# Patient Record
Sex: MALE | Race: WHITE | NOT HISPANIC OR LATINO | Employment: UNEMPLOYED | URBAN - METROPOLITAN AREA
[De-identification: names, ages, dates, MRNs, and addresses within clinical notes are randomized per-mention and may not be internally consistent; named-entity substitution may affect disease eponyms.]

---

## 2024-02-26 ENCOUNTER — LAB (OUTPATIENT)
Dept: LAB | Facility: LAB | Age: 24
End: 2024-02-26
Payer: COMMERCIAL

## 2024-02-26 ENCOUNTER — OFFICE VISIT (OUTPATIENT)
Dept: PRIMARY CARE | Facility: CLINIC | Age: 24
End: 2024-02-26
Payer: COMMERCIAL

## 2024-02-26 VITALS
HEIGHT: 74 IN | TEMPERATURE: 97.5 F | OXYGEN SATURATION: 97 % | SYSTOLIC BLOOD PRESSURE: 120 MMHG | BODY MASS INDEX: 24.1 KG/M2 | DIASTOLIC BLOOD PRESSURE: 81 MMHG | WEIGHT: 187.8 LBS | HEART RATE: 91 BPM

## 2024-02-26 DIAGNOSIS — Z79.899 MEDICATION MANAGEMENT: ICD-10-CM

## 2024-02-26 DIAGNOSIS — F98.8 ATTENTION DEFICIT DISORDER (ADD) WITHOUT HYPERACTIVITY: ICD-10-CM

## 2024-02-26 DIAGNOSIS — Z00.00 WELL ADULT EXAM: ICD-10-CM

## 2024-02-26 DIAGNOSIS — Z00.00 WELL ADULT EXAM: Primary | ICD-10-CM

## 2024-02-26 LAB
25(OH)D3 SERPL-MCNC: 24 NG/ML (ref 30–100)
ALBUMIN SERPL BCP-MCNC: 4.9 G/DL (ref 3.4–5)
ALP SERPL-CCNC: 53 U/L (ref 33–120)
ALT SERPL W P-5'-P-CCNC: 28 U/L (ref 10–52)
AMPHETAMINES UR QL SCN: NORMAL
ANION GAP SERPL CALC-SCNC: 11 MMOL/L (ref 10–20)
AST SERPL W P-5'-P-CCNC: 18 U/L (ref 9–39)
BARBITURATES UR QL SCN: NORMAL
BASOPHILS # BLD AUTO: 0.08 X10*3/UL (ref 0–0.1)
BASOPHILS NFR BLD AUTO: 1.1 %
BENZODIAZ UR QL SCN: NORMAL
BILIRUB SERPL-MCNC: 0.4 MG/DL (ref 0–1.2)
BUN SERPL-MCNC: 8 MG/DL (ref 6–23)
BZE UR QL SCN: NORMAL
CALCIUM SERPL-MCNC: 9.9 MG/DL (ref 8.6–10.3)
CANNABINOIDS UR QL SCN: NORMAL
CHLORIDE SERPL-SCNC: 103 MMOL/L (ref 98–107)
CHOLEST SERPL-MCNC: 143 MG/DL (ref 0–199)
CHOLESTEROL/HDL RATIO: 3.1
CO2 SERPL-SCNC: 31 MMOL/L (ref 21–32)
CREAT SERPL-MCNC: 1.05 MG/DL (ref 0.5–1.3)
EGFRCR SERPLBLD CKD-EPI 2021: >90 ML/MIN/1.73M*2
EOSINOPHIL # BLD AUTO: 0.13 X10*3/UL (ref 0–0.7)
EOSINOPHIL NFR BLD AUTO: 1.8 %
ERYTHROCYTE [DISTWIDTH] IN BLOOD BY AUTOMATED COUNT: 12.3 % (ref 11.5–14.5)
FENTANYL+NORFENTANYL UR QL SCN: NORMAL
GLUCOSE SERPL-MCNC: 89 MG/DL (ref 74–99)
HCT VFR BLD AUTO: 48 % (ref 41–52)
HDLC SERPL-MCNC: 46.5 MG/DL
HGB BLD-MCNC: 16.2 G/DL (ref 13.5–17.5)
IMM GRANULOCYTES # BLD AUTO: 0.03 X10*3/UL (ref 0–0.7)
IMM GRANULOCYTES NFR BLD AUTO: 0.4 % (ref 0–0.9)
LDLC SERPL CALC-MCNC: 82 MG/DL
LYMPHOCYTES # BLD AUTO: 2.31 X10*3/UL (ref 1.2–4.8)
LYMPHOCYTES NFR BLD AUTO: 32.8 %
MCH RBC QN AUTO: 28.7 PG (ref 26–34)
MCHC RBC AUTO-ENTMCNC: 33.8 G/DL (ref 32–36)
MCV RBC AUTO: 85 FL (ref 80–100)
MONOCYTES # BLD AUTO: 0.42 X10*3/UL (ref 0.1–1)
MONOCYTES NFR BLD AUTO: 6 %
NEUTROPHILS # BLD AUTO: 4.07 X10*3/UL (ref 1.2–7.7)
NEUTROPHILS NFR BLD AUTO: 57.9 %
NON HDL CHOLESTEROL: 97 MG/DL (ref 0–149)
NRBC BLD-RTO: 0 /100 WBCS (ref 0–0)
OPIATES UR QL SCN: NORMAL
OXYCODONE+OXYMORPHONE UR QL SCN: NORMAL
PCP UR QL SCN: NORMAL
PLATELET # BLD AUTO: 263 X10*3/UL (ref 150–450)
POTASSIUM SERPL-SCNC: 4.2 MMOL/L (ref 3.5–5.3)
PROT SERPL-MCNC: 7.4 G/DL (ref 6.4–8.2)
RBC # BLD AUTO: 5.64 X10*6/UL (ref 4.5–5.9)
SODIUM SERPL-SCNC: 141 MMOL/L (ref 136–145)
TRIGL SERPL-MCNC: 71 MG/DL (ref 0–149)
TSH SERPL-ACNC: 1.54 MIU/L (ref 0.44–3.98)
VIT B12 SERPL-MCNC: 590 PG/ML (ref 211–911)
VLDL: 14 MG/DL (ref 0–40)
WBC # BLD AUTO: 7 X10*3/UL (ref 4.4–11.3)

## 2024-02-26 PROCEDURE — 82306 VITAMIN D 25 HYDROXY: CPT

## 2024-02-26 PROCEDURE — 1036F TOBACCO NON-USER: CPT | Performed by: INTERNAL MEDICINE

## 2024-02-26 PROCEDURE — 84443 ASSAY THYROID STIM HORMONE: CPT

## 2024-02-26 PROCEDURE — 80061 LIPID PANEL: CPT

## 2024-02-26 PROCEDURE — 82607 VITAMIN B-12: CPT

## 2024-02-26 PROCEDURE — 80053 COMPREHEN METABOLIC PANEL: CPT

## 2024-02-26 PROCEDURE — 36415 COLL VENOUS BLD VENIPUNCTURE: CPT

## 2024-02-26 PROCEDURE — 93000 ELECTROCARDIOGRAM COMPLETE: CPT | Performed by: INTERNAL MEDICINE

## 2024-02-26 PROCEDURE — 85025 COMPLETE CBC W/AUTO DIFF WBC: CPT

## 2024-02-26 PROCEDURE — 99395 PREV VISIT EST AGE 18-39: CPT | Performed by: INTERNAL MEDICINE

## 2024-02-26 PROCEDURE — 80307 DRUG TEST PRSMV CHEM ANLYZR: CPT

## 2024-02-26 PROCEDURE — 99214 OFFICE O/P EST MOD 30 MIN: CPT | Performed by: INTERNAL MEDICINE

## 2024-02-26 RX ORDER — LISDEXAMFETAMINE DIMESYLATE 30 MG/1
30 CAPSULE ORAL EVERY MORNING
Qty: 30 CAPSULE | Refills: 0 | Status: SHIPPED | OUTPATIENT
Start: 2024-04-26 | End: 2024-03-20

## 2024-02-26 RX ORDER — LISDEXAMFETAMINE DIMESYLATE 30 MG/1
30 CAPSULE ORAL EVERY MORNING
Qty: 30 CAPSULE | Refills: 0 | Status: SHIPPED | OUTPATIENT
Start: 2024-02-26 | End: 2024-03-20

## 2024-02-26 RX ORDER — LISDEXAMFETAMINE DIMESYLATE 30 MG/1
30 CAPSULE ORAL EVERY MORNING
Qty: 30 CAPSULE | Refills: 0 | Status: SHIPPED | OUTPATIENT
Start: 2024-03-27 | End: 2024-03-20

## 2024-02-26 ASSESSMENT — PATIENT HEALTH QUESTIONNAIRE - PHQ9
1. LITTLE INTEREST OR PLEASURE IN DOING THINGS: SEVERAL DAYS
2. FEELING DOWN, DEPRESSED OR HOPELESS: NOT AT ALL
10. IF YOU CHECKED OFF ANY PROBLEMS, HOW DIFFICULT HAVE THESE PROBLEMS MADE IT FOR YOU TO DO YOUR WORK, TAKE CARE OF THINGS AT HOME, OR GET ALONG WITH OTHER PEOPLE: SOMEWHAT DIFFICULT
SUM OF ALL RESPONSES TO PHQ9 QUESTIONS 1 AND 2: 1

## 2024-02-26 NOTE — PROGRESS NOTES
"Subjective       Current Issues:  Current concerns include going back to the gym  .fidgeting  Cannot concentrate   Caffeine a lot  3 cups per day  Not tired  Always had trouble concentrating  Door dash  Sleep: all night  No bowel or bladder issues  No cp or sob or depression  Trouble getting tasks done  Review of Nutrition:  Current diet: good   Exercise discussed    Gen:  no fever  HEENT:  no trouble swallowing  CV:  no dyspnea, cyanosis  Lungs:  no shortness of breath  GI:  no constipation, no blood in stool  Vascular:  no edema  Neuro:   no weakness  Skin:  no rash  MS:no joint swelling  Gu:  no urinary complaints  All other systems have been reviewed and are negative for complaint      Screening Questions:  Objective   /81   Pulse 91   Temp 36.4 °C (97.5 °F) (Temporal)   Ht 1.88 m (6' 2\")   Wt 85.2 kg (187 lb 12.8 oz)   SpO2 97%   BMI 24.11 kg/m²       General:   alert and oriented, in no acute distress   Gait:   normal   Skin:   normal   Oral cavity:   lips, mucosa, and tongue normal; teeth and gums normal   Eyes:   sclerae white, pupils equal and reactive   Ears:   normal bilaterally Tms grey   Neck:   no adenopathy and thyroid not enlarged, symmetric, no tenderness/mass/nodules   Lungs:  clear to auscultation bilaterally   Heart:   regular rate and rhythm, S1, S2 normal, no murmur, click, rub or gallop   Abdomen:  soft, non-tender; bowel sounds normal; no masses, no organomegaly   : Ne        Extremities:  extremities normal, warm and well-perfused; no cyanosis, clubbing, or edema,   Neuro:  normal without focal findings and muscle tone and strength normal and symmetric          Az was seen today for primary care physical.  Diagnoses and all orders for this visit:  Well adult exam (Primary)  -     Comprehensive Metabolic Panel; Future  -     TSH with reflex to Free T4 if abnormal; Future  -     Vitamin D 25-Hydroxy,Total (for eval of Vitamin D levels); Future  -     Vitamin B12; Future  -   "   Lipid Panel; Future  -     CBC and Auto Differential; Future  -     ECG 12 Lead  Attention deficit disorder (ADD) without hyperactivity  -     Comprehensive Metabolic Panel; Future  -     TSH with reflex to Free T4 if abnormal; Future  -     Vitamin D 25-Hydroxy,Total (for eval of Vitamin D levels); Future  -     Vitamin B12; Future  -     Lipid Panel; Future  -     CBC and Auto Differential; Future  -     ECG 12 Lead  -     lisdexamfetamine (Vyvanse) 30 mg capsule; Take 1 capsule (30 mg) by mouth once daily in the morning.  -     lisdexamfetamine (Vyvanse) 30 mg capsule; Take 1 capsule (30 mg) by mouth once daily in the morning. Do not start before March 27, 2024.  -     lisdexamfetamine (Vyvanse) 30 mg capsule; Take 1 capsule (30 mg) by mouth once daily in the morning. Do not start before April 26, 2024.  Medication management  -     Comprehensive Metabolic Panel; Future  -     TSH with reflex to Free T4 if abnormal; Future  -     Vitamin D 25-Hydroxy,Total (for eval of Vitamin D levels); Future  -     Vitamin B12; Future  -     Lipid Panel; Future  -     CBC and Auto Differential; Future  -     ECG 12 Lead  -     Drug Screen, Urine With Reflex to Confirmation; Future  -     Drug Screen, Urine With Reflex to Confirmation       Previous labs reviewed.     Follow up in one month or prn   The risks and benefits and side effects of medication were reviewed with the patient.   Subjective   Patient ID: Az Atkins is a 23 y.o. male who presents for primary care physical (Wants to discuss adhd/Has been drinking a lot of coffee 3 a day ).    HPI      Gen:  no fever  HEENT:  no trouble swallowing  CV:  no dyspnea, cyanosis  Lungs:  no shortness of breath  GI:  no constipation, no blood in stool  Vascular:  no edema  Neuro:   no weakness  Skin:  no rash  MS:no joint swelling  Gu:  no urinary complaints  All other systems have been reviewed and are negative for complaint      /81   Pulse 91   Temp 36.4 °C  "(97.5 °F) (Temporal)   Ht 1.88 m (6' 2\")   Wt 85.2 kg (187 lb 12.8 oz)   SpO2 97%   BMI 24.11 kg/m²   Objective   Gen:  alert, nad  Eyes: per sclera white   Neck: supple no tm, no lad  Lungs:  CTAB  Heart:  RRR no murmur  Abd:  soft, NT, ND  Neuro: nonfocal          Assessment/Plan    Problem List Items Addressed This Visit    None  Visit Diagnoses       Well adult exam    -  Primary    Relevant Orders    Comprehensive Metabolic Panel (Completed)    TSH with reflex to Free T4 if abnormal (Completed)    Vitamin D 25-Hydroxy,Total (for eval of Vitamin D levels) (Completed)    Vitamin B12 (Completed)    Lipid Panel (Completed)    CBC and Auto Differential (Completed)    ECG 12 Lead (Completed)    Attention deficit disorder (ADD) without hyperactivity        Relevant Medications    lisdexamfetamine (Vyvanse) 30 mg capsule    lisdexamfetamine (Vyvanse) 30 mg capsule (Start on 3/27/2024)    lisdexamfetamine (Vyvanse) 30 mg capsule (Start on 4/26/2024)    Other Relevant Orders    Comprehensive Metabolic Panel (Completed)    TSH with reflex to Free T4 if abnormal (Completed)    Vitamin D 25-Hydroxy,Total (for eval of Vitamin D levels) (Completed)    Vitamin B12 (Completed)    Lipid Panel (Completed)    CBC and Auto Differential (Completed)    ECG 12 Lead (Completed)    Medication management        Relevant Orders    Comprehensive Metabolic Panel (Completed)    TSH with reflex to Free T4 if abnormal (Completed)    Vitamin D 25-Hydroxy,Total (for eval of Vitamin D levels) (Completed)    Vitamin B12 (Completed)    Lipid Panel (Completed)    CBC and Auto Differential (Completed)    ECG 12 Lead (Completed)    Drug Screen, Urine With Reflex to Confirmation (Completed)            Previous labs reviewed.    The risks and benefits and side effects of medication were reviewed with the patient.     OARRS:  Ashley De Dios MD on 2/26/2024  3:54 PM  I have personally reviewed the OARRS report for Az Atkins. I have " considered the risks of abuse, dependence, addiction and diversion    Is the patient prescribed a combination of a benzodiazepine and opioid?  No    Last Urine Drug Screen / ordered today: Yes  Recent Results (from the past 8760 hour(s))   Drug Screen, Urine With Reflex to Confirmation    Collection Time: 02/26/24  4:45 PM   Result Value Ref Range    Amphetamine Screen, Urine Presumptive Negative Presumptive Negative    Barbiturate Screen, Urine Presumptive Negative Presumptive Negative    Benzodiazepines Screen, Urine Presumptive Negative Presumptive Negative    Cannabinoid Screen, Urine Presumptive Negative Presumptive Negative    Cocaine Metabolite Screen, Urine Presumptive Negative Presumptive Negative    Fentanyl Screen, Urine Presumptive Negative Presumptive Negative    Opiate Screen, Urine Presumptive Negative Presumptive Negative    Oxycodone Screen, Urine Presumptive Negative Presumptive Negative    PCP Screen, Urine Presumptive Negative Presumptive Negative     Results are as expected.     Controlled Substance Agreement:  Date of the Last Agreement: 2/26/24    Reviewed Controlled Substance Agreement including but not limited to the benefits, risks, and alternatives to treatment with a Controlled Substance medication(s).    Stimulants:   What is the patient's goal of therapy? Concentration, focus   Is this being achieved with current treatment? New start

## 2024-02-27 DIAGNOSIS — E55.9 VITAMIN D DEFICIENCY: ICD-10-CM

## 2024-02-27 PROBLEM — N50.3 CYST OF EPIDIDYMIS: Status: ACTIVE | Noted: 2024-02-27

## 2024-02-27 PROBLEM — K21.9 GERD (GASTROESOPHAGEAL REFLUX DISEASE): Status: ACTIVE | Noted: 2024-02-27

## 2024-02-27 PROBLEM — F32.A DEPRESSED: Status: ACTIVE | Noted: 2024-02-27

## 2024-02-27 PROBLEM — Z86.19 HISTORY OF INFECTIOUS DISEASE: Status: ACTIVE | Noted: 2024-02-27

## 2024-02-27 PROBLEM — J35.1 ENLARGED TONSILS: Status: ACTIVE | Noted: 2019-03-21

## 2024-02-27 PROBLEM — R06.02 SHORTNESS OF BREATH: Status: ACTIVE | Noted: 2024-02-27

## 2024-02-27 PROBLEM — R10.9 ABDOMINAL DISCOMFORT: Status: ACTIVE | Noted: 2024-02-27

## 2024-02-27 PROBLEM — F41.9 ANXIETY: Status: ACTIVE | Noted: 2024-02-27

## 2024-02-27 PROBLEM — R53.83 FATIGUE: Status: ACTIVE | Noted: 2019-03-21

## 2024-02-27 PROBLEM — R00.2 PALPITATIONS: Status: ACTIVE | Noted: 2019-03-21

## 2024-02-27 PROBLEM — R20.2 TINGLING: Status: ACTIVE | Noted: 2024-02-27

## 2024-02-27 PROBLEM — N50.9 DISORDER OF MALE GENITAL ORGANS: Status: ACTIVE | Noted: 2024-02-27

## 2024-02-27 PROBLEM — R20.2 TINGLING OF SKIN: Status: ACTIVE | Noted: 2024-02-27

## 2024-02-27 PROBLEM — R14.0 BLOATING SYMPTOM: Status: ACTIVE | Noted: 2024-02-27

## 2024-02-27 PROBLEM — I86.1 LEFT VARICOCELE: Status: ACTIVE | Noted: 2024-02-27

## 2024-02-27 PROBLEM — R79.89 ELEVATED LFTS: Status: ACTIVE | Noted: 2024-02-27

## 2024-02-27 RX ORDER — ERGOCALCIFEROL 1.25 MG/1
50000 CAPSULE ORAL
Qty: 6 CAPSULE | Refills: 0 | Status: SHIPPED | OUTPATIENT
Start: 2024-02-27 | End: 2024-04-09

## 2024-03-18 ENCOUNTER — HOSPITAL ENCOUNTER (EMERGENCY)
Facility: HOSPITAL | Age: 24
Discharge: HOME | End: 2024-03-18
Payer: COMMERCIAL

## 2024-03-18 ENCOUNTER — TELEPHONE (OUTPATIENT)
Dept: PRIMARY CARE | Facility: CLINIC | Age: 24
End: 2024-03-18

## 2024-03-18 ENCOUNTER — APPOINTMENT (OUTPATIENT)
Dept: CARDIOLOGY | Facility: HOSPITAL | Age: 24
End: 2024-03-18
Payer: COMMERCIAL

## 2024-03-18 ENCOUNTER — APPOINTMENT (OUTPATIENT)
Dept: RADIOLOGY | Facility: HOSPITAL | Age: 24
End: 2024-03-18
Payer: COMMERCIAL

## 2024-03-18 VITALS
HEIGHT: 74 IN | WEIGHT: 180 LBS | SYSTOLIC BLOOD PRESSURE: 121 MMHG | OXYGEN SATURATION: 100 % | RESPIRATION RATE: 17 BRPM | DIASTOLIC BLOOD PRESSURE: 82 MMHG | BODY MASS INDEX: 23.1 KG/M2 | TEMPERATURE: 98.1 F | HEART RATE: 89 BPM

## 2024-03-18 DIAGNOSIS — K29.70 GASTRITIS WITHOUT BLEEDING, UNSPECIFIED CHRONICITY, UNSPECIFIED GASTRITIS TYPE: ICD-10-CM

## 2024-03-18 DIAGNOSIS — R06.02 SHORTNESS OF BREATH: Primary | ICD-10-CM

## 2024-03-18 LAB
ALBUMIN SERPL BCP-MCNC: 5 G/DL (ref 3.4–5)
ALP SERPL-CCNC: 49 U/L (ref 33–120)
ALT SERPL W P-5'-P-CCNC: 23 U/L (ref 10–52)
ANION GAP SERPL CALC-SCNC: 12 MMOL/L (ref 10–20)
AST SERPL W P-5'-P-CCNC: 19 U/L (ref 9–39)
BASOPHILS # BLD AUTO: 0.08 X10*3/UL (ref 0–0.1)
BASOPHILS NFR BLD AUTO: 1.1 %
BILIRUB SERPL-MCNC: 0.8 MG/DL (ref 0–1.2)
BNP SERPL-MCNC: 13 PG/ML (ref 0–99)
BUN SERPL-MCNC: 9 MG/DL (ref 6–23)
CALCIUM SERPL-MCNC: 9.8 MG/DL (ref 8.6–10.3)
CARDIAC TROPONIN I PNL SERPL HS: 3 NG/L (ref 0–20)
CARDIAC TROPONIN I PNL SERPL HS: <3 NG/L (ref 0–20)
CHLORIDE SERPL-SCNC: 102 MMOL/L (ref 98–107)
CO2 SERPL-SCNC: 29 MMOL/L (ref 21–32)
CREAT SERPL-MCNC: 1.09 MG/DL (ref 0.5–1.3)
D DIMER PPP FEU-MCNC: <215 NG/ML FEU
EGFRCR SERPLBLD CKD-EPI 2021: >90 ML/MIN/1.73M*2
EOSINOPHIL # BLD AUTO: 0.22 X10*3/UL (ref 0–0.7)
EOSINOPHIL NFR BLD AUTO: 3 %
ERYTHROCYTE [DISTWIDTH] IN BLOOD BY AUTOMATED COUNT: 12.2 % (ref 11.5–14.5)
FLUAV RNA RESP QL NAA+PROBE: NOT DETECTED
FLUBV RNA RESP QL NAA+PROBE: NOT DETECTED
GLUCOSE SERPL-MCNC: 82 MG/DL (ref 74–99)
HCT VFR BLD AUTO: 47.4 % (ref 41–52)
HGB BLD-MCNC: 16.5 G/DL (ref 13.5–17.5)
IMM GRANULOCYTES # BLD AUTO: 0.02 X10*3/UL (ref 0–0.7)
IMM GRANULOCYTES NFR BLD AUTO: 0.3 % (ref 0–0.9)
LACTATE SERPL-SCNC: 1 MMOL/L (ref 0.4–2)
LYMPHOCYTES # BLD AUTO: 3.07 X10*3/UL (ref 1.2–4.8)
LYMPHOCYTES NFR BLD AUTO: 41.7 %
MAGNESIUM SERPL-MCNC: 2.21 MG/DL (ref 1.6–2.4)
MCH RBC QN AUTO: 29.2 PG (ref 26–34)
MCHC RBC AUTO-ENTMCNC: 34.8 G/DL (ref 32–36)
MCV RBC AUTO: 84 FL (ref 80–100)
MONOCYTES # BLD AUTO: 0.5 X10*3/UL (ref 0.1–1)
MONOCYTES NFR BLD AUTO: 6.8 %
NEUTROPHILS # BLD AUTO: 3.48 X10*3/UL (ref 1.2–7.7)
NEUTROPHILS NFR BLD AUTO: 47.1 %
NRBC BLD-RTO: 0 /100 WBCS (ref 0–0)
PLATELET # BLD AUTO: 231 X10*3/UL (ref 150–450)
POTASSIUM SERPL-SCNC: 3.6 MMOL/L (ref 3.5–5.3)
PROT SERPL-MCNC: 7.7 G/DL (ref 6.4–8.2)
RBC # BLD AUTO: 5.65 X10*6/UL (ref 4.5–5.9)
RSV RNA RESP QL NAA+PROBE: NOT DETECTED
SARS-COV-2 RNA RESP QL NAA+PROBE: NOT DETECTED
SODIUM SERPL-SCNC: 139 MMOL/L (ref 136–145)
WBC # BLD AUTO: 7.4 X10*3/UL (ref 4.4–11.3)

## 2024-03-18 PROCEDURE — 87637 SARSCOV2&INF A&B&RSV AMP PRB: CPT | Performed by: PHYSICIAN ASSISTANT

## 2024-03-18 PROCEDURE — 83880 ASSAY OF NATRIURETIC PEPTIDE: CPT | Performed by: PHYSICIAN ASSISTANT

## 2024-03-18 PROCEDURE — 36415 COLL VENOUS BLD VENIPUNCTURE: CPT | Performed by: PHYSICIAN ASSISTANT

## 2024-03-18 PROCEDURE — 99283 EMERGENCY DEPT VISIT LOW MDM: CPT | Mod: 25

## 2024-03-18 PROCEDURE — 71045 X-RAY EXAM CHEST 1 VIEW: CPT

## 2024-03-18 PROCEDURE — 84484 ASSAY OF TROPONIN QUANT: CPT | Performed by: PHYSICIAN ASSISTANT

## 2024-03-18 PROCEDURE — 93005 ELECTROCARDIOGRAM TRACING: CPT

## 2024-03-18 PROCEDURE — 85025 COMPLETE CBC W/AUTO DIFF WBC: CPT | Performed by: PHYSICIAN ASSISTANT

## 2024-03-18 PROCEDURE — 85379 FIBRIN DEGRADATION QUANT: CPT | Performed by: PHYSICIAN ASSISTANT

## 2024-03-18 PROCEDURE — 83605 ASSAY OF LACTIC ACID: CPT | Performed by: PHYSICIAN ASSISTANT

## 2024-03-18 PROCEDURE — 80053 COMPREHEN METABOLIC PANEL: CPT | Performed by: PHYSICIAN ASSISTANT

## 2024-03-18 PROCEDURE — 71045 X-RAY EXAM CHEST 1 VIEW: CPT | Performed by: STUDENT IN AN ORGANIZED HEALTH CARE EDUCATION/TRAINING PROGRAM

## 2024-03-18 PROCEDURE — 83735 ASSAY OF MAGNESIUM: CPT | Performed by: PHYSICIAN ASSISTANT

## 2024-03-18 RX ORDER — SUCRALFATE 1 G/10ML
1 SUSPENSION ORAL 4 TIMES DAILY
Qty: 1200 ML | Refills: 0 | Status: SHIPPED | OUTPATIENT
Start: 2024-03-18 | End: 2024-03-20 | Stop reason: WASHOUT

## 2024-03-18 ASSESSMENT — COLUMBIA-SUICIDE SEVERITY RATING SCALE - C-SSRS
6. HAVE YOU EVER DONE ANYTHING, STARTED TO DO ANYTHING, OR PREPARED TO DO ANYTHING TO END YOUR LIFE?: NO
2. HAVE YOU ACTUALLY HAD ANY THOUGHTS OF KILLING YOURSELF?: NO
1. IN THE PAST MONTH, HAVE YOU WISHED YOU WERE DEAD OR WISHED YOU COULD GO TO SLEEP AND NOT WAKE UP?: NO

## 2024-03-18 ASSESSMENT — LIFESTYLE VARIABLES
EVER HAD A DRINK FIRST THING IN THE MORNING TO STEADY YOUR NERVES TO GET RID OF A HANGOVER: NO
HAVE YOU EVER FELT YOU SHOULD CUT DOWN ON YOUR DRINKING: NO
EVER FELT BAD OR GUILTY ABOUT YOUR DRINKING: NO
HAVE PEOPLE ANNOYED YOU BY CRITICIZING YOUR DRINKING: NO

## 2024-03-18 ASSESSMENT — PAIN - FUNCTIONAL ASSESSMENT: PAIN_FUNCTIONAL_ASSESSMENT: 0-10

## 2024-03-18 ASSESSMENT — PAIN DESCRIPTION - LOCATION: LOCATION: CHEST

## 2024-03-18 ASSESSMENT — PAIN DESCRIPTION - PAIN TYPE: TYPE: ACUTE PAIN

## 2024-03-18 ASSESSMENT — PAIN SCALES - GENERAL: PAINLEVEL_OUTOF10: 5 - MODERATE PAIN

## 2024-03-18 NOTE — ED PROVIDER NOTES
HPI   Chief Complaint   Patient presents with    Shortness of Breath     Pt presents to the ED with SOB that has gotten worse since Tuesday. Feels like its hard to inhale. Making it difficult for him to sleep.        A 23-year-old male patient comes in the emergency department today with complaints of left-sided chest pain, shortness of breath.  States started couple days ago.  Describes it as a achiness or pressure.  Rates it a 5 out of 10 on the pain scale.  Patient states he feels short of breath extremities deep breath and causes him discomfort.  Denies any recent travel, recent surgeries.  Denies any fevers or chills.  States he does have a cough.  Otherwise no other complaints at present time.  This purpose comes in the emergency department today further evaluation.  States he is under a lot of stress not sure that stress related wants to make sure there is nothing critical serious going on.                          [place] Coma Scale Score: 15                     Patient History   Past Medical History:   Diagnosis Date    Personal history of other infectious and parasitic diseases     History of rotavirus infection    Personal history of other specified conditions 11/09/2022    History of shortness of breath     Past Surgical History:   Procedure Laterality Date    CIRCUMCISION, PRIMARY  11/01/2017    Elective Circumcision     Family History   Problem Relation Name Age of Onset    Breast cancer Mother  45    No Known Problems Father      No Known Problems Brother       Social History     Tobacco Use    Smoking status: Never    Smokeless tobacco: Never   Substance Use Topics    Alcohol use: Yes     Comment: occ weekends    Drug use: Never       Physical Exam   ED Triage Vitals [03/18/24 0914]   Temperature Heart Rate Respirations BP   36.7 °C (98.1 °F) 89 17 121/82      Pulse Ox Temp Source Heart Rate Source Patient Position   100 % Temporal Monitor Sitting      BP Location FiO2 (%)     Right arm --        Physical Exam  Constitutional:       General: He is in acute distress (Mild distress).      Appearance: Normal appearance. He is not ill-appearing or diaphoretic.   HENT:      Head: Normocephalic and atraumatic.      Nose: Nose normal.   Eyes:      Extraocular Movements: Extraocular movements intact.      Conjunctiva/sclera: Conjunctivae normal.      Pupils: Pupils are equal, round, and reactive to light.   Cardiovascular:      Rate and Rhythm: Normal rate and regular rhythm.   Pulmonary:      Effort: Pulmonary effort is normal. No respiratory distress.      Breath sounds: Normal breath sounds. No stridor. No wheezing.   Chest:      Chest wall: No tenderness or crepitus.   Musculoskeletal:         General: Normal range of motion.      Cervical back: Normal range of motion.   Skin:     General: Skin is warm and dry.   Neurological:      General: No focal deficit present.      Mental Status: He is alert and oriented to person, place, and time. Mental status is at baseline.   Psychiatric:         Mood and Affect: Mood normal.         ED Course & MDM   Diagnoses as of 03/18/24 1134   Shortness of breath   Gastritis without bleeding, unspecified chronicity, unspecified gastritis type       Medical Decision Making  A 23-year-old male patient comes in the emergency department today with complaints of left-sided chest pain, shortness of breath.  States started couple days ago.  Describes it as a achiness or pressure.  Rates it a 5 out of 10 on the pain scale.  Patient states he feels short of breath extremities deep breath and causes him discomfort.  Denies any recent travel, recent surgeries.  Denies any fevers or chills.  States he does have a cough.  Otherwise no other complaints at present time.  This purpose comes in the emergency department today further evaluation.  States he is under a lot of stress not sure that stress related wants to make sure there is nothing critical serious going on.    EKG, chest x-ray,  laboratory studies ordered to rule out ACS, arrhythmias, electrolyte abnormalities, COVID-19, influenza, RSV, pulmonary emboli.,  Pneumonia, pulmonary congestion.      Patient has negative delta troponins negative for influenza COVID-19 RSV.  Negative BNP.  Negative CMP negative magnesium negative lactate negative D-dimer negative CBC.  Chest x-ray is negative as well.  Will discharge patient home with follow-up with his PCP.  Patient states his stomach is also little uncomfortable.  Will send patient home on p.o. Carafate.  Patient agrees with this plan expressed full verbal understanding.  I did offer patient a work note patient declined.    Historian is the patient    Diagnosis: Shortness of breath      Labs Reviewed   COMPREHENSIVE METABOLIC PANEL - Normal       Result Value    Glucose 82      Sodium 139      Potassium 3.6      Chloride 102      Bicarbonate 29      Anion Gap 12      Urea Nitrogen 9      Creatinine 1.09      eGFR >90      Calcium 9.8      Albumin 5.0      Alkaline Phosphatase 49      Total Protein 7.7      AST 19      Bilirubin, Total 0.8      ALT 23     MAGNESIUM - Normal    Magnesium 2.21     LACTATE - Normal    Lactate 1.0      Narrative:     Venipuncture immediately after or during the administration of Metamizole may lead to falsely low results. Testing should be performed immediately  prior to Metamizole dosing.   B-TYPE NATRIURETIC PEPTIDE - Normal    BNP 13      Narrative:        <100 pg/mL - Heart failure unlikely  100-299 pg/mL - Intermediate probability of acute heart                  failure exacerbation. Correlate with clinical                  context and patient history.    >=300 pg/mL - Heart Failure likely. Correlate with clinical                  context and patient history.    BNP testing is performed using different testing methodology at [facility] than at other [facility]. Direct result comparisons should only be made within the same method.      D-DIMER,  VTE EXCLUSION - Normal    D-Dimer, Quantitative VTE Exclusion <215      Narrative:     The VTE Exclusion D-Dimer assay is reported in ng/mL Fibrinogen Equivalent Units (FEU).    Per 's instructions for use, a value of less than 500 ng/mL (FEU) may help to exclude DVT or PE in outpatients when the assay is used with a clinical pretest probability assessment.([facility] must utilize and document eCalc 'Wells Score Deep Vein Thrombosis Risk' for DVT exclusion only. Emergency Department should utilize  Guidelines for Emergency Department Use of the VTE Exclusion D-Dimer and Clinical Pretest probability assessment model for DVT or PE exclusion.)   SARS-COV-2 AND INFLUENZA A/B PCR - Normal    Flu A Result Not Detected      Flu B Result Not Detected      Coronavirus 2019, PCR Not Detected      Narrative:     This assay has received FDA Emergency Use Authorization (EUA) and  is only authorized for the duration of time that circumstances exist to justify the authorization of the emergency use of in vitro diagnostic tests for the detection of SARS-CoV-2 virus and/or diagnosis of COVID-19 infection under section 564(b)(1) of the Act, 21 U.S.C. 360bbb-3(b)(1). Testing for SARS-CoV-2 is only recommended for patients who meet current clinical and/or epidemiological criteria as defined by federal, state, or local public health directives. This assay is an in vitro diagnostic nucleic acid amplification test for the qualitative detection of SARS-CoV-2, Influenza A, and Influenza B from nasopharyngeal specimens and has been validated for use at [facility]. Negative results do not preclude COVID-19 infections or Influenza A/B infections, and should not be used as the sole basis for diagnosis, treatment, or other management decisions. If Influenza A/B and RSV PCR results are negative, testing for Parainfluenza virus, Adenovirus and Metapneumovirus is routinely performed for [facility] [place] and  intensive care inpatients, and is available on other patients by placing an add-on request.    RSV PCR - Normal    RSV PCR Not Detected      Narrative:     This assay is an FDA-cleared, in vitro diagnostic nucleic acid amplification test for the detection of RSV from nasopharyngeal specimens, and has been validated for use at [facility]. Negative results do not preclude RSV infections, and should not be used as the sole basis for diagnosis, treatment, or other management decisions. If Influenza A/B and RSV PCR results are negative, testing for Parainfluenza virus, Adenovirus and Metapneumovirus is routinely performed for pediatric oncology and intensive care inpatients at [facility], and is available on other patients by placing an add-on request.       SERIAL TROPONIN-INITIAL - Normal    Troponin I, High Sensitivity 3      Narrative:     Less than 99th percentile of normal range cutoff-  Female and children under 18 years old <14 ng/L; Male <21 ng/L: Negative  Repeat testing should be performed if clinically indicated.     Female and children under 18 years old 14-50 ng/L; Male 21-50 ng/L:  Consistent with possible cardiac damage and possible increased clinical   risk. Serial measurements may help to assess extent of myocardial damage.     >50 ng/L: Consistent with cardiac damage, increased clinical risk and  myocardial infarction. Serial measurements may help assess extent of   myocardial damage.      NOTE: Children less than 1 year old may have higher baseline troponin   levels and results should be interpreted in conjunction with the overall   clinical context.     NOTE: Troponin I testing is performed using a different   testing methodology at [facility] than at [facility]. Direct result comparisons should only   be made within the same method.   SERIAL TROPONIN, 1 HOUR - Normal    Troponin I, High Sensitivity <3      Narrative:     Less than 99th percentile of  normal range cutoff-  Female and children under 18 years old <14 ng/L; Male <21 ng/L: Negative  Repeat testing should be performed if clinically indicated.     Female and children under 18 years old 14-50 ng/L; Male 21-50 ng/L:  Consistent with possible cardiac damage and possible increased clinical   risk. Serial measurements may help to assess extent of myocardial damage.     >50 ng/L: Consistent with cardiac damage, increased clinical risk and  myocardial infarction. Serial measurements may help assess extent of   myocardial damage.      NOTE: Children less than 1 year old may have higher baseline troponin   levels and results should be interpreted in conjunction with the overall   clinical context.     NOTE: Troponin I testing is performed using a different   testing methodology at [facility] than at other   [facility]. Direct result comparisons should only   be made within the same method.   TROPONIN SERIES- (INITIAL, 1 HR)    Narrative:     The following orders were created for panel order Troponin I Series, High Sensitivity (0, 1 HR).  Procedure                               Abnormality         Status                     ---------                               -----------         ------                     Troponin I, High Sensiti...[[id number]]  Normal              Final result               Troponin, High Sensitivi...[[id number]]  Normal              Final result                 Please view results for these tests on the individual orders.   CBC WITH AUTO DIFFERENTIAL    WBC 7.4      nRBC 0.0      RBC 5.65      Hemoglobin 16.5      Hematocrit 47.4      MCV 84      MCH 29.2      MCHC 34.8      RDW 12.2      Platelets 231      Neutrophils % 47.1      Immature Granulocytes %, Automated 0.3      Lymphocytes % 41.7      Monocytes % 6.8      Eosinophils % 3.0      Basophils % 1.1      Neutrophils Absolute 3.48      Immature Granulocytes Absolute, Automated 0.02      Lymphocytes Absolute 3.07       Monocytes Absolute 0.50      Eosinophils Absolute 0.22      Basophils Absolute 0.08          XR chest 1 view   Final Result   No acute cardiopulmonary process.        MACRO   None        Signed by: Jenn Noland 3/18/2024 9:51 AM   Dictation workstation:   [id number]            Procedure  ECG 12 lead    Performed by: Pj Coello PA-C  Authorized by: Pj Coello PA-C    Interpretation:     Details:  My EKG interpretation  Rate:     ECG rate:  65    ECG rate assessment: normal    Rhythm:     Rhythm: sinus rhythm    ST segments:     ST segments:  Normal  T waves:     T waves: normal         Pj Coello PA-C  03/18/24 1134

## 2024-03-18 NOTE — TELEPHONE ENCOUNTER
Pt called in and left VM stated that Father called in and spoke with someone earlier regarding Pt having SOB. Pt went into ED to be checked out. Xray , EKG and blood work all came back normal and he was cleared to go home. Pt needs scheduled this week for a follow up appointment with MATIAS or NP . Pt stated that while in ED they prescribed him medication for his stomach but Rx is very expensive and would like to discuss Rx options, lung check, general medical questions.     Clerical please reach out to Pt an help schedule this week per MATIAS. - ED F/U

## 2024-03-19 ENCOUNTER — OFFICE VISIT (OUTPATIENT)
Dept: PRIMARY CARE | Facility: CLINIC | Age: 24
End: 2024-03-19
Payer: COMMERCIAL

## 2024-03-19 ENCOUNTER — HOSPITAL ENCOUNTER (OUTPATIENT)
Dept: RADIOLOGY | Facility: CLINIC | Age: 24
Discharge: HOME | End: 2024-03-19
Payer: COMMERCIAL

## 2024-03-19 VITALS
OXYGEN SATURATION: 98 % | SYSTOLIC BLOOD PRESSURE: 113 MMHG | HEIGHT: 74 IN | HEART RATE: 81 BPM | WEIGHT: 188.6 LBS | DIASTOLIC BLOOD PRESSURE: 65 MMHG | TEMPERATURE: 97 F | BODY MASS INDEX: 24.2 KG/M2

## 2024-03-19 DIAGNOSIS — H61.21 EXCESSIVE CERUMEN IN RIGHT EAR CANAL: ICD-10-CM

## 2024-03-19 DIAGNOSIS — R10.9 ABDOMINAL PAIN, UNSPECIFIED ABDOMINAL LOCATION: ICD-10-CM

## 2024-03-19 DIAGNOSIS — F98.8 ATTENTION DEFICIT DISORDER (ADD) WITHOUT HYPERACTIVITY: Primary | ICD-10-CM

## 2024-03-19 DIAGNOSIS — K59.00 CONSTIPATION, UNSPECIFIED CONSTIPATION TYPE: ICD-10-CM

## 2024-03-19 DIAGNOSIS — R42 VERTIGO: ICD-10-CM

## 2024-03-19 LAB
ATRIAL RATE: 65 BPM
P AXIS: 46 DEGREES
P OFFSET: 194 MS
P ONSET: 142 MS
PR INTERVAL: 152 MS
Q ONSET: 218 MS
QRS COUNT: 10 BEATS
QRS DURATION: 98 MS
QT INTERVAL: 382 MS
QTC CALCULATION(BAZETT): 397 MS
QTC FREDERICIA: 392 MS
R AXIS: 86 DEGREES
T AXIS: 61 DEGREES
T OFFSET: 409 MS
VENTRICULAR RATE: 65 BPM

## 2024-03-19 PROCEDURE — 74018 RADEX ABDOMEN 1 VIEW: CPT | Performed by: RADIOLOGY

## 2024-03-19 PROCEDURE — 1036F TOBACCO NON-USER: CPT | Performed by: INTERNAL MEDICINE

## 2024-03-19 PROCEDURE — 69209 REMOVE IMPACTED EAR WAX UNI: CPT | Performed by: INTERNAL MEDICINE

## 2024-03-19 PROCEDURE — 74018 RADEX ABDOMEN 1 VIEW: CPT

## 2024-03-19 PROCEDURE — 99214 OFFICE O/P EST MOD 30 MIN: CPT | Performed by: INTERNAL MEDICINE

## 2024-03-19 RX ORDER — DEXTROAMPHETAMINE SACCHARATE, AMPHETAMINE ASPARTATE MONOHYDRATE, DEXTROAMPHETAMINE SULFATE AND AMPHETAMINE SULFATE 2.5; 2.5; 2.5; 2.5 MG/1; MG/1; MG/1; MG/1
10 CAPSULE, EXTENDED RELEASE ORAL EVERY MORNING
Qty: 30 CAPSULE | Refills: 0 | Status: SHIPPED | OUTPATIENT
Start: 2024-03-19 | End: 2024-04-18 | Stop reason: WASHOUT

## 2024-03-19 RX ORDER — POLYETHYLENE GLYCOL 3350 17 G/17G
17 POWDER, FOR SOLUTION ORAL DAILY
Qty: 30 PACKET | Refills: 1 | Status: SHIPPED | OUTPATIENT
Start: 2024-03-19 | End: 2024-07-17

## 2024-03-19 RX ORDER — DEXTROAMPHETAMINE SACCHARATE, AMPHETAMINE ASPARTATE MONOHYDRATE, DEXTROAMPHETAMINE SULFATE AND AMPHETAMINE SULFATE 2.5; 2.5; 2.5; 2.5 MG/1; MG/1; MG/1; MG/1
10 CAPSULE, EXTENDED RELEASE ORAL EVERY MORNING
Qty: 30 CAPSULE | Refills: 0 | Status: SHIPPED | OUTPATIENT
Start: 2024-05-18 | End: 2024-04-18 | Stop reason: WASHOUT

## 2024-03-19 RX ORDER — ASCORBIC ACID 125 MG
TABLET,CHEWABLE ORAL
COMMUNITY

## 2024-03-19 RX ORDER — DEXTROAMPHETAMINE SACCHARATE, AMPHETAMINE ASPARTATE MONOHYDRATE, DEXTROAMPHETAMINE SULFATE AND AMPHETAMINE SULFATE 2.5; 2.5; 2.5; 2.5 MG/1; MG/1; MG/1; MG/1
10 CAPSULE, EXTENDED RELEASE ORAL EVERY MORNING
Qty: 30 CAPSULE | Refills: 0 | Status: SHIPPED | OUTPATIENT
Start: 2024-04-18 | End: 2024-04-18 | Stop reason: WASHOUT

## 2024-03-19 ASSESSMENT — PATIENT HEALTH QUESTIONNAIRE - PHQ9
1. LITTLE INTEREST OR PLEASURE IN DOING THINGS: NOT AT ALL
SUM OF ALL RESPONSES TO PHQ9 QUESTIONS 1 AND 2: 0
2. FEELING DOWN, DEPRESSED OR HOPELESS: NOT AT ALL

## 2024-03-19 NOTE — PROGRESS NOTES
"Subjective   Patient ID: Az Atkins is a 23 y.o. male who presents for Follow-up (Er follow up and medical questions./Sob - went ed /EKG, xray and blood work clear/Constipation - stomach issues /Breathing better/Possible anxiety ).  HPI  Had some constipation  Laxative   Was using a few per day  Mom helped  Bm daily now still feels like he   Does not  evacuate enough  Vertigo on Monday Tues breathing issues progressively worse  1 hour of sleep   Went to er  EKG,cxr ok   ? Anxiety  Slept well last night  Vertigo better     Vyvanse cost prohibitive   Review of Systems  Gen:  no fever  HEENT:  no trouble swallowing  CV:  no dyspnea, cyanosis  Lungs:  no shortness of breath  GI:    no blood in stool  Vascular:  no edema  Neuro:   no weakness  Skin:  no rash  MS:no joint swelling  Gu:  no urinary complaints  All other systems have been reviewed and are negative for complaint    /65   Pulse 81   Temp 36.1 °C (97 °F) (Temporal)   Ht 1.88 m (6' 2\")   Wt 85.5 kg (188 lb 9.6 oz)   SpO2 98%   BMI 24.21 kg/m²   Objective   Physical Exam  Lab Results   Component Value Date    WBC 7.4 03/18/2024    HGB 16.5 03/18/2024    HCT 47.4 03/18/2024    MCV 84 03/18/2024     03/18/2024     Lab Results   Component Value Date    GLUCOSE 82 03/18/2024    CALCIUM 9.8 03/18/2024     03/18/2024    K 3.6 03/18/2024    CO2 29 03/18/2024     03/18/2024    BUN 9 03/18/2024    CREATININE 1.09 03/18/2024     Social History     Socioeconomic History    Marital status: Single     Spouse name: None    Number of children: None    Years of education: None    Highest education level: None   Occupational History    None   Tobacco Use    Smoking status: Never    Smokeless tobacco: Never   Substance and Sexual Activity    Alcohol use: Yes     Comment: occ weekends    Drug use: Never    Sexual activity: None   Other Topics Concern    None   Social History Narrative    None     Social Determinants of Health     Financial " Resource Strain: Not on file   Food Insecurity: Not on file   Transportation Needs: Not on file   Physical Activity: Not on file   Stress: Not on file   Social Connections: Not on file   Intimate Partner Violence: Not on file   Housing Stability: Not on file     Family History   Problem Relation Name Age of Onset    Breast cancer Mother  45    No Known Problems Father      No Known Problems Brother         General:  Alert and in  NAD  Heent:  tms nl, throat clear.   Prior to flush r cerumen imp   Lungs, CTAB  Skin:  no suspicious lesions,  warm and dry  Head :  Normocephalic  Neck/thyroid:  neck supple, full rom, no cervical lymphadenopathy  no thyromegaly  Heart:  RRR  no murmurs  Abdomen:  Normal , bs present, soft, nontender, not distended, no masses palpated  Extremities:  No clubbing, cyanosis, or edema  Neurologic:  Nonfocal  Psych: alert, normal mood   Az was seen today for follow-up.  Diagnoses and all orders for this visit:  Attention deficit disorder (ADD) without hyperactivity (Primary)  -     amphetamine-dextroamphetamine XR (Adderall XR) 10 mg 24 hr capsule; Take 1 capsule (10 mg) by mouth once daily in the morning. Do not crush or chew.  -     amphetamine-dextroamphetamine XR (Adderall XR) 10 mg 24 hr capsule; Take 1 capsule (10 mg) by mouth once daily in the morning. Do not crush or chew. Do not start before April 18, 2024.  -     amphetamine-dextroamphetamine XR (Adderall XR) 10 mg 24 hr capsule; Take 1 capsule (10 mg) by mouth once daily in the morning. Do not crush or chew. Do not start before May 18, 2024.  Abdominal pain, unspecified abdominal location  -     XR abdomen 1 view; Future  Excessive cerumen in right ear canal  -     Ear cerumen removal; Future  Vertigo  Constipation, unspecified constipation type  -     polyethylene glycol (Glycolax, Miralax) 17 gram packet; Take 17 g by mouth once daily. Mix 1 cap (17g) into 8 ounces of fluid.  Other orders  -     Ear Cerumen Removal    Follow  up in one month or prn

## 2024-03-19 NOTE — PROGRESS NOTES
Controlled Substance Visit:  I have personally reviewed the OARRS report and have considered the risks of abuse, dependence, addiction and diversion and I believe that it is clinically appropriate for the patient to be prescribed this medication.    Is the patient prescribed a combination of a benzodiazepine and opioid?  No    Last Urine Drug Screen / ordered today: No  Recent Results (from the past 8760 hour(s))   Drug Screen, Urine With Reflex to Confirmation    Collection Time: 24  4:45 PM   Result Value Ref Range    Amphetamine Screen, Urine Presumptive Negative Presumptive Negative    Barbiturate Screen, Urine Presumptive Negative Presumptive Negative    Benzodiazepines Screen, Urine Presumptive Negative Presumptive Negative    Cannabinoid Screen, Urine Presumptive Negative Presumptive Negative    Cocaine Metabolite Screen, Urine Presumptive Negative Presumptive Negative    Fentanyl Screen, Urine Presumptive Negative Presumptive Negative    Opiate Screen, Urine Presumptive Negative Presumptive Negative    Oxycodone Screen, Urine Presumptive Negative Presumptive Negative    PCP Screen, Urine Presumptive Negative Presumptive Negative     Results are as expected.     Controlled Substance Agreement:  Date of the Last Agreement: 2024  I have reviewed each line item on the Controlled Substance Agreement including, but not limited to, the benefits, risks, and alternatives to treatment with a Controlled Substance medication(s). The patient has verbalized understanding and signed the agreement.    Stimulants:   What is the patient's goal of therapy? add  Is this being achieved with current treatment? New start     Activities of Daily Living:   Is your overall impression that this patient is benefiting (symptom reduction outweighs side effects) from stimulant therapy? New start     1. Physical Functionin. Family Relationship:   3. Social Relationship:   4. Mood:   5. Sleep Patterns:   6. Overall  Function:       Gen: Alert, NAD  HEENT:  PERRL  conjunctiva and sclera normal in appearance; Neck supple  Respiratory:  Lungs CTAB  Cardiovascular:  Heart RRR. No M/R/G  Abdomen: soft, nontender, nondistended   Neuro:  Gross motor and sensory intact  Skin:  No suspicious lesions present   Mood: normal        Agree

## 2024-03-19 NOTE — PROGRESS NOTES
Ear Cerumen Removal    Date/Time: 3/19/2024 11:47 AM    Performed by: Marely Colon MA  Authorized by: Ashley De Dios MD    Consent:     Consent obtained:  Verbal    Consent given by:  Patient  Universal protocol:     Patient identity confirmed:  Verbally with patient  Procedure details:     Location:  R ear    Procedure type: irrigation      Procedure outcomes: cerumen removed    Post-procedure details:     Procedure completion:  Tolerated well, no immediate complications

## 2024-04-17 PROBLEM — F90.0 ATTENTION DEFICIT HYPERACTIVITY DISORDER (ADHD), PREDOMINANTLY INATTENTIVE TYPE: Status: ACTIVE | Noted: 2024-04-17

## 2024-04-18 ENCOUNTER — OFFICE VISIT (OUTPATIENT)
Dept: PRIMARY CARE | Facility: CLINIC | Age: 24
End: 2024-04-18
Payer: COMMERCIAL

## 2024-04-18 VITALS
DIASTOLIC BLOOD PRESSURE: 73 MMHG | OXYGEN SATURATION: 97 % | BODY MASS INDEX: 24.6 KG/M2 | WEIGHT: 191.6 LBS | TEMPERATURE: 97.2 F | HEART RATE: 87 BPM | SYSTOLIC BLOOD PRESSURE: 123 MMHG

## 2024-04-18 DIAGNOSIS — Z79.899 MEDICATION MANAGEMENT: ICD-10-CM

## 2024-04-18 DIAGNOSIS — F90.0 ATTENTION DEFICIT HYPERACTIVITY DISORDER (ADHD), PREDOMINANTLY INATTENTIVE TYPE: Primary | ICD-10-CM

## 2024-04-18 PROBLEM — J35.1 ENLARGED TONSILS: Status: RESOLVED | Noted: 2019-03-21 | Resolved: 2024-04-18

## 2024-04-18 PROCEDURE — 1036F TOBACCO NON-USER: CPT | Performed by: NURSE PRACTITIONER

## 2024-04-18 PROCEDURE — 99214 OFFICE O/P EST MOD 30 MIN: CPT | Performed by: NURSE PRACTITIONER

## 2024-04-18 RX ORDER — DEXTROAMPHETAMINE SACCHARATE, AMPHETAMINE ASPARTATE MONOHYDRATE, DEXTROAMPHETAMINE SULFATE AND AMPHETAMINE SULFATE 2.5; 2.5; 2.5; 2.5 MG/1; MG/1; MG/1; MG/1
10 CAPSULE, EXTENDED RELEASE ORAL EVERY MORNING
Qty: 30 CAPSULE | Refills: 0 | Status: SHIPPED | OUTPATIENT
Start: 2024-04-18 | End: 2024-05-20 | Stop reason: SDUPTHER

## 2024-04-18 ASSESSMENT — PATIENT HEALTH QUESTIONNAIRE - PHQ9
1. LITTLE INTEREST OR PLEASURE IN DOING THINGS: NOT AT ALL
2. FEELING DOWN, DEPRESSED OR HOPELESS: NOT AT ALL
SUM OF ALL RESPONSES TO PHQ9 QUESTIONS 1 AND 2: 0

## 2024-04-18 NOTE — PROGRESS NOTES
"Problem List Items Addressed This Visit       Attention deficit hyperactivity disorder (ADHD), predominantly inattentive type - Primary    Overview     3/19/24: adderall XR 10 mg initiated  4/18/24: tolerating well and effective. Ok to titrate. 90 day fu, prn          Relevant Medications    amphetamine-dextroamphetamine XR (Adderall XR) 10 mg 24 hr capsule    Medication management    Overview     CSA, UDS, OARRS, CSV [facility] compliant                Subjective   Patient ID: Az Atkins is a 23 y.o. male who presents for Follow-up (Med follow up/No current problems right now ).  HPI  ADD  Organization  Emotionally shut down  Forgetful  Small tasked seemed huge  He struggled in school  Works at [employer]     MATIAS started adderall XR 10 mg 3/19/24  Was having some bad dreams  - resolved  Rising earlier  Able to maintain focus  Feels less overwhelmed    Review of Systems   All other systems reviewed and are negative.      BP Readings from Last 3 Encounters:   04/18/24 123/73   03/19/24 113/65   03/18/24 121/82      Wt Readings from Last 3 Encounters:   04/18/24 86.9 kg (191 lb 9.6 oz)   03/19/24 85.5 kg (188 lb 9.6 oz)   03/18/24 81.6 kg (180 lb)      BMI:   Estimated body mass index is 24.6 kg/m² as calculated from the following:    Height as of 3/19/24: 1.88 m (6' 2\").    Weight as of this encounter: 86.9 kg (191 lb 9.6 oz).    Objective   Physical Exam  Constitutional:       General: He is not in acute distress.  HENT:      Head: Normocephalic and atraumatic.      Right Ear: Tympanic membrane and external ear normal.      Left Ear: Tympanic membrane and external ear normal.      Nose: Nose normal.      Mouth/Throat:      Mouth: Mucous membranes are moist.   Eyes:      Extraocular Movements: Extraocular movements intact.      Conjunctiva/sclera: Conjunctivae normal.   Cardiovascular:      Rate and Rhythm: Normal rate and regular rhythm.      Pulses: Normal pulses.   Pulmonary:      Effort: Pulmonary effort is normal.      " Breath sounds: Normal breath sounds.   Musculoskeletal:         General: Normal range of motion.      Cervical back: Normal range of motion and neck supple.   Skin:     General: Skin is warm and dry.   Neurological:      General: No focal deficit present.      Mental Status: He is alert.   Psychiatric:         Mood and Affect: Mood normal.      Comments: Fidgety

## 2024-05-20 DIAGNOSIS — F90.0 ATTENTION DEFICIT HYPERACTIVITY DISORDER (ADHD), PREDOMINANTLY INATTENTIVE TYPE: ICD-10-CM

## 2024-05-20 RX ORDER — DEXTROAMPHETAMINE SACCHARATE, AMPHETAMINE ASPARTATE MONOHYDRATE, DEXTROAMPHETAMINE SULFATE AND AMPHETAMINE SULFATE 2.5; 2.5; 2.5; 2.5 MG/1; MG/1; MG/1; MG/1
10 CAPSULE, EXTENDED RELEASE ORAL EVERY MORNING
Qty: 30 CAPSULE | Refills: 0 | Status: SHIPPED | OUTPATIENT
Start: 2024-05-20 | End: 2024-06-19

## 2024-06-18 DIAGNOSIS — F90.0 ATTENTION DEFICIT HYPERACTIVITY DISORDER (ADHD), PREDOMINANTLY INATTENTIVE TYPE: ICD-10-CM

## 2024-06-18 RX ORDER — DEXTROAMPHETAMINE SACCHARATE, AMPHETAMINE ASPARTATE MONOHYDRATE, DEXTROAMPHETAMINE SULFATE AND AMPHETAMINE SULFATE 2.5; 2.5; 2.5; 2.5 MG/1; MG/1; MG/1; MG/1
10 CAPSULE, EXTENDED RELEASE ORAL EVERY MORNING
Qty: 30 CAPSULE | Refills: 0 | Status: SHIPPED | OUTPATIENT
Start: 2024-06-18 | End: 2024-07-18

## 2024-07-10 NOTE — PROGRESS NOTES
Problem List Items Addressed This Visit       Anxiety    Relevant Orders    Referral to [facility]    Attention deficit hyperactivity disorder (ADHD), predominantly inattentive type - Primary    Overview     3/19/24: adderall XR 10 mg initiated  4/18/24: tolerating well and effective. Ok to titrate. 90 day fu, prn   7/11/24: mutually patient & Sadaf unclear if this is ADD or unmanaged anxiety/depression. He is in agreement with [facility] & med [facility]. He may try stopping adderall for now         Relevant Medications    amphetamine-dextroamphetamine XR (Adderall XR) 10 mg 24 hr capsule    Other Relevant Orders    Referral to [facility]    Depressed    Relevant Orders    Referral to [facility]        Controlled Substance Visit:  I have personally reviewed the OARRS report and have considered the risks of abuse, dependence, addiction and diversion and I believe that it is clinically appropriate for the patient to be prescribed this medication.    Is the patient prescribed a combination of a benzodiazepine and opioid?  No    Last Urine Drug Screen / ordered today: No  Recent Results (from the past 8760 hour(s))   Drug Screen, Urine With Reflex to Confirmation    Collection Time: 02/26/24  4:45 PM   Result Value Ref Range    Amphetamine Screen, Urine Presumptive Negative Presumptive Negative    Barbiturate Screen, Urine Presumptive Negative Presumptive Negative    Benzodiazepines Screen, Urine Presumptive Negative Presumptive Negative    Cannabinoid Screen, Urine Presumptive Negative Presumptive Negative    Cocaine Metabolite Screen, Urine Presumptive Negative Presumptive Negative    Fentanyl Screen, Urine Presumptive Negative Presumptive Negative    Opiate Screen, Urine Presumptive Negative Presumptive Negative    Oxycodone Screen, Urine Presumptive Negative Presumptive Negative    PCP Screen, Urine Presumptive Negative  Presumptive Negative     Results are as expected.     Controlled Substance Agreement:  Date of the Last Agreement: 3/19/24  I have reviewed each line item on the Controlled Substance Agreement including, but not limited to, the benefits, risks, and alternatives to treatment with a Controlled Substance medication(s). The patient has verbalized understanding and signed the agreement.    Stimulants:   What is the patient's goal of therapy? ADD  Is this being achieved with current treatment? Sometimes     Activities of Daily Living:   Is your overall impression that this patient is benefiting (symptom reduction outweighs side effects) from stimulant therapy? Yes     1. Physical Functioning: Better  2. Family Relationship: Same  3. Social Relationship: Same  4. Mood: Same  5. Sleep Patterns: Same  6. Overall Function: Better      Gen: Alert, NAD  HEENT:  PERRLA, EOMI, conjunctiva and sclera normal in appearance; Neck supple  Respiratory:  Lungs CTAB  Cardiovascular:  Heart RRR. No M/R/G  Neuro:  Gross motor and sensory intact  Skin:  No suspicious lesions present   Mood: normal

## 2024-07-11 ENCOUNTER — LAB (OUTPATIENT)
Dept: LAB | Facility: LAB | Age: 24
End: 2024-07-11
Payer: COMMERCIAL

## 2024-07-11 ENCOUNTER — APPOINTMENT (OUTPATIENT)
Dept: PRIMARY CARE | Facility: CLINIC | Age: 24
End: 2024-07-11
Payer: COMMERCIAL

## 2024-07-11 VITALS
BODY MASS INDEX: 24.41 KG/M2 | DIASTOLIC BLOOD PRESSURE: 67 MMHG | HEIGHT: 74 IN | OXYGEN SATURATION: 96 % | WEIGHT: 190.2 LBS | SYSTOLIC BLOOD PRESSURE: 100 MMHG | HEART RATE: 77 BPM | TEMPERATURE: 96.3 F

## 2024-07-11 DIAGNOSIS — F90.0 ATTENTION DEFICIT HYPERACTIVITY DISORDER (ADHD), PREDOMINANTLY INATTENTIVE TYPE: Primary | ICD-10-CM

## 2024-07-11 DIAGNOSIS — F32.A DEPRESSION, UNSPECIFIED DEPRESSION TYPE: ICD-10-CM

## 2024-07-11 DIAGNOSIS — E55.9 VITAMIN D DEFICIENCY: ICD-10-CM

## 2024-07-11 DIAGNOSIS — F41.9 ANXIETY: ICD-10-CM

## 2024-07-11 PROCEDURE — 99213 OFFICE O/P EST LOW 20 MIN: CPT | Performed by: NURSE PRACTITIONER

## 2024-07-11 PROCEDURE — 36415 COLL VENOUS BLD VENIPUNCTURE: CPT

## 2024-07-11 PROCEDURE — 82306 VITAMIN D 25 HYDROXY: CPT

## 2024-07-11 PROCEDURE — 1036F TOBACCO NON-USER: CPT | Performed by: NURSE PRACTITIONER

## 2024-07-11 RX ORDER — DEXTROAMPHETAMINE SACCHARATE, AMPHETAMINE ASPARTATE MONOHYDRATE, DEXTROAMPHETAMINE SULFATE AND AMPHETAMINE SULFATE 2.5; 2.5; 2.5; 2.5 MG/1; MG/1; MG/1; MG/1
10 CAPSULE, EXTENDED RELEASE ORAL EVERY MORNING
Qty: 30 CAPSULE | Refills: 0 | Status: SHIPPED | OUTPATIENT
Start: 2024-07-11 | End: 2024-08-10

## 2024-07-11 ASSESSMENT — PATIENT HEALTH QUESTIONNAIRE - PHQ9
SUM OF ALL RESPONSES TO PHQ9 QUESTIONS 1 AND 2: 2
10. IF YOU CHECKED OFF ANY PROBLEMS, HOW DIFFICULT HAVE THESE PROBLEMS MADE IT FOR YOU TO DO YOUR WORK, TAKE CARE OF THINGS AT HOME, OR GET ALONG WITH OTHER PEOPLE: VERY DIFFICULT
1. LITTLE INTEREST OR PLEASURE IN DOING THINGS: NOT AT ALL
2. FEELING DOWN, DEPRESSED OR HOPELESS: MORE THAN HALF THE DAYS

## 2024-07-12 DIAGNOSIS — E55.9 VITAMIN D DEFICIENCY: ICD-10-CM

## 2024-07-12 LAB — 25(OH)D3 SERPL-MCNC: 53 NG/ML (ref 30–100)

## 2024-08-22 DIAGNOSIS — F90.0 ATTENTION DEFICIT HYPERACTIVITY DISORDER (ADHD), PREDOMINANTLY INATTENTIVE TYPE: ICD-10-CM

## 2024-08-23 RX ORDER — DEXTROAMPHETAMINE SACCHARATE, AMPHETAMINE ASPARTATE MONOHYDRATE, DEXTROAMPHETAMINE SULFATE AND AMPHETAMINE SULFATE 2.5; 2.5; 2.5; 2.5 MG/1; MG/1; MG/1; MG/1
10 CAPSULE, EXTENDED RELEASE ORAL EVERY MORNING
Qty: 30 CAPSULE | Refills: 0 | Status: SHIPPED | OUTPATIENT
Start: 2024-08-23 | End: 2024-09-22

## 2024-09-04 ENCOUNTER — TELEMEDICINE (OUTPATIENT)
Dept: PRIMARY CARE | Facility: CLINIC | Age: 24
End: 2024-09-04
Payer: COMMERCIAL

## 2024-09-04 DIAGNOSIS — U07.1 COVID-19: Primary | ICD-10-CM

## 2024-09-04 PROCEDURE — 1036F TOBACCO NON-USER: CPT | Performed by: NURSE PRACTITIONER

## 2024-09-04 PROCEDURE — 99213 OFFICE O/P EST LOW 20 MIN: CPT | Performed by: NURSE PRACTITIONER

## 2024-09-04 ASSESSMENT — PATIENT HEALTH QUESTIONNAIRE - PHQ9
2. FEELING DOWN, DEPRESSED OR HOPELESS: NOT AT ALL
1. LITTLE INTEREST OR PLEASURE IN DOING THINGS: NOT AT ALL
SUM OF ALL RESPONSES TO PHQ9 QUESTIONS 1 AND 2: 0

## 2024-09-04 NOTE — PROGRESS NOTES
An interactive audio and video telecommunication system which permits real time communications between the patient (at the originating site) and provider (at the distant site) was utilized to provide this telehealth service.  Verbal consent was requested and obtained from the patient for this telehealth visit.     Subjective   Patient ID: Az Atkins is a 23 y.o. male who presents for Sick Visit (Monday sx's started /Tested positive yesterday /Sx's - fatigue , achy joints, chills , body aches /Requesting Dr's note /).    Sx onset 9/1  In the evening had really bad chills before bed  9/2 symptoms hit hard  Fatigue  Achy joints  Chills  Body aches  No fever  No SOB or wheeze  Coughing and congestion          Review of Systems  ROS completely negative except what was mentioned in the HPI.  Problem List, surgical, social, and family histories which were reviewed and updated as necessary within the EMR. I also personally reviewed the notes, labs, and imaging that pertained to what was documented or discussed in the HPI.    Objective   Physical Exam  Nursing note reviewed.   Constitutional:       Appearance: Normal appearance.   HENT:      Head: Normocephalic.      Right Ear: External ear normal.      Left Ear: External ear normal.      Nose: Nose normal.      Mouth/Throat:      Mouth: Mucous membranes are moist.   Eyes:      Extraocular Movements: Extraocular movements intact.      Conjunctiva/sclera: Conjunctivae normal.      Pupils: Pupils are equal, round, and reactive to light.   Pulmonary:      Effort: Pulmonary effort is normal.   Musculoskeletal:      Cervical back: Normal range of motion.   Neurological:      General: No focal deficit present.      Mental Status: He is alert and oriented to person, place, and time. Mental status is at baseline.   Psychiatric:         Mood and Affect: Mood normal.         Behavior: Behavior normal.         Thought Content: Thought content normal.         Judgment: Judgment  normal.         There were no vitals taken for this visit.    Assessment/Plan    Problem List Items Addressed This Visit    None  Visit Diagnoses       COVID-19    -  Primary    sx onsetl 9/1.  Discussed symptomatic care.  Work excuse given.  All questions and concerns addressed to pt satisfaction

## 2024-09-04 NOTE — PATIENT INSTRUCTIONS
"GENERAL INFORMATION ABOUT VIRAL ILLNESSES    The typical course of an acute virus is rapid onset of symptoms that peak around day 3 through 5.  Symptoms begin to taper, then slowly improve each day around day 7-10 and thereafter.  Many times, individuals can have a nagging \"post viral cough\" that can last for several weeks that require only symptomatic care.  Post viral cough tends to be worse at night, productive in the morning, and improved throughout the day.  If initial symptoms improve and then worsen around 10 days, or simply do not improve, this could indicate now a bacterial infection and warrant a course of antibiotics & further evaluation.    Over the counter remedies include, but not limited to:    Delsym (Dextromethorphan) - to help suppress a cough  Mucinex (Guaifenesin) - an expectorant to loosen mucous and allow a cough to be more productive, decreasing risk for pneumonia or sinusitis  Mucinex DM (Guaifenesin & Dextromethorphan) - cough suppressant and expectorant in one  Flonase or Rhinocort - a nasal steroid spray to help open the sinuses, improve ear pain, & improve post nasal drip that may be contributing to the cough.  This is not used \"as needed\" but daily throughout the illness to be effective.  Nasal Saline spray - to keep mucous thin so it is easier to eliminate    Cough drops, hot tea, warm salt water gargles, Airborne, Tylenol &/or Ibuprofen    Some individuals may be prescribed Tessalon Pearls to help with cough, Albuterol Inhaler to help with wheezing and shortness of breath, oral or inhaled steroids for other lung complications    "

## 2024-09-04 NOTE — LETTER
September 4, 2024     Patient: Az Atkins   YOB: 2000   Date of Visit: 9/4/2024       To Whom It May Concern:    Az Atkins was seen in my clinic on 9/4/2024 at 9:40 am.   It has been advised that Az does not work 9/5/24, and may return to work 9/8/24 if symptoms are improving.    If you have any questions or concerns, please don't hesitate to call.         Sincerely,         Amena Estrada, APRN-CNP

## 2024-09-27 ENCOUNTER — TELEPHONE (OUTPATIENT)
Dept: PRIMARY CARE | Facility: CLINIC | Age: 24
End: 2024-09-27
Payer: COMMERCIAL

## 2024-09-27 DIAGNOSIS — F90.0 ATTENTION DEFICIT HYPERACTIVITY DISORDER (ADHD), PREDOMINANTLY INATTENTIVE TYPE: ICD-10-CM

## 2024-09-27 RX ORDER — DEXTROAMPHETAMINE SACCHARATE, AMPHETAMINE ASPARTATE MONOHYDRATE, DEXTROAMPHETAMINE SULFATE AND AMPHETAMINE SULFATE 2.5; 2.5; 2.5; 2.5 MG/1; MG/1; MG/1; MG/1
10 CAPSULE, EXTENDED RELEASE ORAL EVERY MORNING
Qty: 90 CAPSULE | Refills: 0 | Status: SHIPPED | OUTPATIENT
Start: 2024-09-27

## 2024-09-27 NOTE — TELEPHONE ENCOUNTER
Pt called in and I spoke with him about medication he is requesting to have refilled. Pt is requesting a refill on his Adderall 10 mg 24 hr capsule. Pt stated that he feels like medication has really been helping him. Pt requesting refill to be sent to [facility] in [place] on [address] .

## 2024-11-01 DIAGNOSIS — F90.0 ATTENTION DEFICIT HYPERACTIVITY DISORDER (ADHD), PREDOMINANTLY INATTENTIVE TYPE: ICD-10-CM

## 2024-11-01 RX ORDER — DEXTROAMPHETAMINE SACCHARATE, AMPHETAMINE ASPARTATE MONOHYDRATE, DEXTROAMPHETAMINE SULFATE AND AMPHETAMINE SULFATE 2.5; 2.5; 2.5; 2.5 MG/1; MG/1; MG/1; MG/1
10 CAPSULE, EXTENDED RELEASE ORAL EVERY MORNING
Qty: 30 CAPSULE | Refills: 0 | Status: SHIPPED | OUTPATIENT
Start: 2024-11-01

## 2024-11-07 DIAGNOSIS — F90.0 ATTENTION DEFICIT HYPERACTIVITY DISORDER (ADHD), PREDOMINANTLY INATTENTIVE TYPE: ICD-10-CM

## 2024-11-07 RX ORDER — DEXTROAMPHETAMINE SACCHARATE, AMPHETAMINE ASPARTATE MONOHYDRATE, DEXTROAMPHETAMINE SULFATE AND AMPHETAMINE SULFATE 2.5; 2.5; 2.5; 2.5 MG/1; MG/1; MG/1; MG/1
10 CAPSULE, EXTENDED RELEASE ORAL EVERY MORNING
Qty: 30 CAPSULE | Refills: 0 | Status: SHIPPED | OUTPATIENT
Start: 2024-11-07

## 2024-12-03 DIAGNOSIS — F90.0 ATTENTION DEFICIT HYPERACTIVITY DISORDER (ADHD), PREDOMINANTLY INATTENTIVE TYPE: ICD-10-CM

## 2024-12-03 RX ORDER — DEXTROAMPHETAMINE SACCHARATE, AMPHETAMINE ASPARTATE MONOHYDRATE, DEXTROAMPHETAMINE SULFATE AND AMPHETAMINE SULFATE 2.5; 2.5; 2.5; 2.5 MG/1; MG/1; MG/1; MG/1
10 CAPSULE, EXTENDED RELEASE ORAL EVERY MORNING
Qty: 30 CAPSULE | Refills: 0 | Status: SHIPPED | OUTPATIENT
Start: 2024-12-03

## 2025-01-02 DIAGNOSIS — F90.0 ATTENTION DEFICIT HYPERACTIVITY DISORDER (ADHD), PREDOMINANTLY INATTENTIVE TYPE: ICD-10-CM

## 2025-01-02 RX ORDER — DEXTROAMPHETAMINE SACCHARATE, AMPHETAMINE ASPARTATE MONOHYDRATE, DEXTROAMPHETAMINE SULFATE AND AMPHETAMINE SULFATE 2.5; 2.5; 2.5; 2.5 MG/1; MG/1; MG/1; MG/1
10 CAPSULE, EXTENDED RELEASE ORAL EVERY MORNING
Qty: 30 CAPSULE | Refills: 0 | Status: SHIPPED | OUTPATIENT
Start: 2025-01-02

## 2025-01-27 DIAGNOSIS — F90.0 ATTENTION DEFICIT HYPERACTIVITY DISORDER (ADHD), PREDOMINANTLY INATTENTIVE TYPE: ICD-10-CM

## 2025-01-27 RX ORDER — DEXTROAMPHETAMINE SACCHARATE, AMPHETAMINE ASPARTATE MONOHYDRATE, DEXTROAMPHETAMINE SULFATE AND AMPHETAMINE SULFATE 2.5; 2.5; 2.5; 2.5 MG/1; MG/1; MG/1; MG/1
10 CAPSULE, EXTENDED RELEASE ORAL EVERY MORNING
Qty: 30 CAPSULE | Refills: 0 | Status: SHIPPED | OUTPATIENT
Start: 2025-01-27

## 2025-02-25 DIAGNOSIS — F90.0 ATTENTION DEFICIT HYPERACTIVITY DISORDER (ADHD), PREDOMINANTLY INATTENTIVE TYPE: ICD-10-CM

## 2025-02-25 RX ORDER — DEXTROAMPHETAMINE SACCHARATE, AMPHETAMINE ASPARTATE MONOHYDRATE, DEXTROAMPHETAMINE SULFATE AND AMPHETAMINE SULFATE 2.5; 2.5; 2.5; 2.5 MG/1; MG/1; MG/1; MG/1
10 CAPSULE, EXTENDED RELEASE ORAL EVERY MORNING
Qty: 30 CAPSULE | Refills: 0 | Status: SHIPPED | OUTPATIENT
Start: 2025-02-25

## 2025-02-26 ENCOUNTER — APPOINTMENT (OUTPATIENT)
Dept: PRIMARY CARE | Facility: CLINIC | Age: 25
End: 2025-02-26
Payer: COMMERCIAL

## 2025-03-04 ENCOUNTER — APPOINTMENT (OUTPATIENT)
Dept: PRIMARY CARE | Facility: CLINIC | Age: 25
End: 2025-03-04
Payer: COMMERCIAL

## 2025-03-04 VITALS
TEMPERATURE: 98.2 F | HEIGHT: 74 IN | DIASTOLIC BLOOD PRESSURE: 74 MMHG | SYSTOLIC BLOOD PRESSURE: 114 MMHG | HEART RATE: 83 BPM | WEIGHT: 183 LBS | BODY MASS INDEX: 23.49 KG/M2 | OXYGEN SATURATION: 98 %

## 2025-03-04 DIAGNOSIS — Z00.00 WELL ADULT EXAM: Primary | ICD-10-CM

## 2025-03-04 DIAGNOSIS — F84.0 AUTISM SPECTRUM (HHS-HCC): ICD-10-CM

## 2025-03-04 DIAGNOSIS — F98.8 ATTENTION DEFICIT DISORDER, UNSPECIFIED TYPE: ICD-10-CM

## 2025-03-04 DIAGNOSIS — Z79.899 MEDICATION MANAGEMENT: ICD-10-CM

## 2025-03-04 DIAGNOSIS — E55.9 VITAMIN D DEFICIENCY: ICD-10-CM

## 2025-03-04 PROCEDURE — 99395 PREV VISIT EST AGE 18-39: CPT | Performed by: INTERNAL MEDICINE

## 2025-03-04 PROCEDURE — 1036F TOBACCO NON-USER: CPT | Performed by: INTERNAL MEDICINE

## 2025-03-04 PROCEDURE — 3008F BODY MASS INDEX DOCD: CPT | Performed by: INTERNAL MEDICINE

## 2025-03-04 PROCEDURE — 99213 OFFICE O/P EST LOW 20 MIN: CPT | Performed by: INTERNAL MEDICINE

## 2025-03-04 ASSESSMENT — ANXIETY QUESTIONNAIRES
3. WORRYING TOO MUCH ABOUT DIFFERENT THINGS: NOT AT ALL
1. FEELING NERVOUS, ANXIOUS, OR ON EDGE: NOT AT ALL
IF YOU CHECKED OFF ANY PROBLEMS ON THIS QUESTIONNAIRE, HOW DIFFICULT HAVE THESE PROBLEMS MADE IT FOR YOU TO DO YOUR WORK, TAKE CARE OF THINGS AT HOME, OR GET ALONG WITH OTHER PEOPLE: NOT DIFFICULT AT ALL
GAD7 TOTAL SCORE: 1
6. BECOMING EASILY ANNOYED OR IRRITABLE: NOT AT ALL
5. BEING SO RESTLESS THAT IT IS HARD TO SIT STILL: NOT AT ALL
7. FEELING AFRAID AS IF SOMETHING AWFUL MIGHT HAPPEN: NOT AT ALL
2. NOT BEING ABLE TO STOP OR CONTROL WORRYING: SEVERAL DAYS
4. TROUBLE RELAXING: NOT AT ALL

## 2025-03-04 ASSESSMENT — PATIENT HEALTH QUESTIONNAIRE - PHQ9
SUM OF ALL RESPONSES TO PHQ9 QUESTIONS 1 AND 2: 0
1. LITTLE INTEREST OR PLEASURE IN DOING THINGS: NOT AT ALL
2. FEELING DOWN, DEPRESSED OR HOPELESS: NOT AT ALL

## 2025-03-04 NOTE — PROGRESS NOTES
"   Over the past 2 weeks, how often have you been bothered by any of the following problems?     Unable to screen due to: --   Little interest or pleasure in doing things Not at all   Feeling down, depressed, or hopeless Not at all   Patient Health Questionnaire-2 Score 0     Working at [employer] started in may    Feels better  Content  Working out  Room clean  Helping out around the house  Adderall helps   Sleep ok  No cp no palpitations  Some caffeine  Sleep better  Still social relationships  Can trip over words  Mind races  Brother on spectrum  Pt wonders if he is  Trouble retaining social relationships  Trouble w emotional reactions to others    Controlled Substance Visit:  I have personally reviewed the OARRS report and have considered the risks of abuse, dependence, addiction and diversion and I believe that it is clinically appropriate for the patient to be prescribed this medication.    Is the patient prescribed a combination of a benzodiazepine and opioid?  No    Last Urine Drug Screen / ordered today: Yes  No results found for this or any previous visit (from the past 8760 hours).  N/A    Controlled Substance Agreement:  Date of the Last Agreement: 3/4/2025  I have reviewed each line item on the Controlled Substance Agreement including, but not limited to, the benefits, risks, and alternatives to treatment with a Controlled Substance medication(s). The patient has verbalized understanding and signed the agreement.  /74   Pulse 83   Temp 36.8 °C (98.2 °F)   Ht 1.88 m (6' 2\")   Wt 83 kg (183 lb)   SpO2 98%   BMI 23.50 kg/m²     Stimulants:   What is the patient's goal of therapy? add  Is this being achieved with current treatment? yes    Activities of Daily Living:   Is your overall impression that this patient is benefiting (symptom reduction outweighs side effects) from stimulant therapy? Yes     1. Physical Functioning: Better  2. Family Relationship: Better  3. Social Relationship: Better  4. " "Mood: Better  5. Sleep Patterns: Better  6. Overall Function: Better     Subjective       Current Issues:   Sleep: all night  No bowel or bladder issues  No cp or sob or depression    Review of Nutrition:  Current diet: doing better    Exercise discussed daily    Gen:  no fever  HEENT:  no trouble swallowing  CV:  no dyspnea, cyanosis  Lungs:  no shortness of breath  GI:  no constipation, no blood in stool  Vascular:  no edema  Neuro:   no weakness  Skin:  no rash  MS:no joint swelling  Gu:  no urinary complaints  All other systems have been reviewed and are negative for complaint      Depression Screen  (Note: if answer to either of the following is \"Yes\", then a more complete depression screening is indicated)   Q1: Over the past two weeks, have you felt down, depressed or hopeless? No  Q2: Over the past two weeks, have you felt little interest or pleasure in doing things? No    Screening Questions:  Objective   /74   Pulse 83   Temp 36.8 °C (98.2 °F)   Ht 1.88 m (6' 2\")   Wt 83 kg (183 lb)   SpO2 98%   BMI 23.50 kg/m²       General:   alert and oriented, in no acute distress   Gait:   normal   Skin:   normal   Oral cavity:   lips, mucosa, and tongue normal; teeth and gums normal   Eyes:   sclerae white, pupils equal and reactive   Ears:   normal bilaterally Tms grey   Neck:   no adenopathy and thyroid not enlarged, symmetric, no tenderness/mass/nodules   Lungs:  clear to auscultation bilaterally   Heart:   regular rate and rhythm, S1, S2 normal, no murmur, click, rub or gallop   Abdomen:  soft, non-tender; bowel sounds normal; no masses, no organomegaly   : ne       Extremities:  extremities normal, warm and well-perfused; no cyanosis, clubbing, or edema,   Neuro:  normal without focal findings and muscle tone and strength normal and symmetric       Az was seen today for annual exam.  Diagnoses and all orders for this visit:  Well adult exam (Primary)  -     Comprehensive Metabolic Panel; " Future  -     TSH with reflex to Free T4 if abnormal; Future  -     Vitamin B12; Future  -     CBC and Auto Differential; Future  -     Hemoglobin A1C; Future  -     Comprehensive Metabolic Panel  -     TSH with reflex to Free T4 if abnormal  -     Vitamin B12  -     CBC and Auto Differential  -     Hemoglobin A1C  Medication management  -     Amphetamine Confirm, Urine; Future  -     Drug Screen, Urine With Reflex to Confirmation; Future  -     Comprehensive Metabolic Panel; Future  -     TSH with reflex to Free T4 if abnormal; Future  -     Vitamin B12; Future  -     CBC and Auto Differential; Future  -     Hemoglobin A1C; Future  -     Comprehensive Metabolic Panel  -     TSH with reflex to Free T4 if abnormal  -     Vitamin B12  -     CBC and Auto Differential  -     Hemoglobin A1C  Attention deficit disorder, unspecified type  -     Comprehensive Metabolic Panel; Future  -     TSH with reflex to Free T4 if abnormal; Future  -     Vitamin B12; Future  -     CBC and Auto Differential; Future  -     Hemoglobin A1C; Future  -     Comprehensive Metabolic Panel  -     TSH with reflex to Free T4 if abnormal  -     Vitamin B12  -     CBC and Auto Differential  -     Hemoglobin A1C  Vitamin D deficiency  -     Comprehensive Metabolic Panel; Future  -     TSH with reflex to Free T4 if abnormal; Future  -     Vitamin D 25-Hydroxy,Total (for eval of Vitamin D levels); Future  -     Vitamin B12; Future  -     CBC and Auto Differential; Future  -     Hemoglobin A1C; Future  -     Comprehensive Metabolic Panel  -     TSH with reflex to Free T4 if abnormal  -     Vitamin D 25-Hydroxy,Total (for eval of Vitamin D levels)  -     Vitamin B12  -     CBC and Auto Differential  -     Hemoglobin A1C  Autism spectrum ([facility])  -     Referral to Neurology; Future  -     Referral to Psychology; Future    Chronic conditions reviewed in the assessment and plan.    Continue medications unless specified otherwise.  Previous labs reviewed.    Other specialty provider notes reviewed.   Follow up in 6 months or prn

## 2025-03-05 LAB
25(OH)D3+25(OH)D2 SERPL-MCNC: 59 NG/ML (ref 30–100)
ALBUMIN SERPL-MCNC: 5 G/DL (ref 3.6–5.1)
ALP SERPL-CCNC: 56 U/L (ref 36–130)
ALT SERPL-CCNC: 22 U/L (ref 9–46)
ANION GAP SERPL CALCULATED.4IONS-SCNC: 7 MMOL/L (CALC) (ref 7–17)
AST SERPL-CCNC: 19 U/L (ref 10–40)
BASOPHILS # BLD AUTO: 77 CELLS/UL (ref 0–200)
BASOPHILS NFR BLD AUTO: 1 %
BILIRUB SERPL-MCNC: 0.4 MG/DL (ref 0.2–1.2)
BUN SERPL-MCNC: 15 MG/DL (ref 7–25)
CALCIUM SERPL-MCNC: 9.8 MG/DL (ref 8.6–10.3)
CHLORIDE SERPL-SCNC: 103 MMOL/L (ref 98–110)
CO2 SERPL-SCNC: 30 MMOL/L (ref 20–32)
CREAT SERPL-MCNC: 0.98 MG/DL (ref 0.6–1.24)
EGFRCR SERPLBLD CKD-EPI 2021: 110 ML/MIN/1.73M2
EOSINOPHIL # BLD AUTO: 193 CELLS/UL (ref 15–500)
EOSINOPHIL NFR BLD AUTO: 2.5 %
ERYTHROCYTE [DISTWIDTH] IN BLOOD BY AUTOMATED COUNT: 12.2 % (ref 11–15)
EST. AVERAGE GLUCOSE BLD GHB EST-MCNC: 97 MG/DL
EST. AVERAGE GLUCOSE BLD GHB EST-SCNC: 5.4 MMOL/L
GLUCOSE SERPL-MCNC: 88 MG/DL (ref 65–99)
HBA1C MFR BLD: 5 % OF TOTAL HGB
HCT VFR BLD AUTO: 47.6 % (ref 38.5–50)
HGB BLD-MCNC: 15.6 G/DL (ref 13.2–17.1)
LYMPHOCYTES # BLD AUTO: 2541 CELLS/UL (ref 850–3900)
LYMPHOCYTES NFR BLD AUTO: 33 %
MCH RBC QN AUTO: 28 PG (ref 27–33)
MCHC RBC AUTO-ENTMCNC: 32.8 G/DL (ref 32–36)
MCV RBC AUTO: 85.3 FL (ref 80–100)
MONOCYTES # BLD AUTO: 485 CELLS/UL (ref 200–950)
MONOCYTES NFR BLD AUTO: 6.3 %
NEUTROPHILS # BLD AUTO: 4404 CELLS/UL (ref 1500–7800)
NEUTROPHILS NFR BLD AUTO: 57.2 %
PLATELET # BLD AUTO: 253 THOUSAND/UL (ref 140–400)
PMV BLD REES-ECKER: 9.6 FL (ref 7.5–12.5)
POTASSIUM SERPL-SCNC: 4 MMOL/L (ref 3.5–5.3)
PROT SERPL-MCNC: 7.2 G/DL (ref 6.1–8.1)
RBC # BLD AUTO: 5.58 MILLION/UL (ref 4.2–5.8)
SODIUM SERPL-SCNC: 140 MMOL/L (ref 135–146)
TSH SERPL-ACNC: 1.28 MIU/L (ref 0.4–4.5)
VIT B12 SERPL-MCNC: 598 PG/ML (ref 200–1100)
WBC # BLD AUTO: 7.7 THOUSAND/UL (ref 3.8–10.8)

## 2025-03-06 LAB
AMPHET UR-MCNC: 615 NG/ML
AMPHETAMINES UR QL: POSITIVE NG/ML
BARBITURATES UR QL: NEGATIVE NG/ML
BENZODIAZ UR QL: NEGATIVE NG/ML
BZE UR QL: NEGATIVE NG/ML
CREAT UR-MCNC: 37.3 MG/DL
DRUG SCREEN COMMENT UR-IMP: ABNORMAL
METHADONE UR QL: NEGATIVE NG/ML
METHAMPHET UR-MCNC: NEGATIVE NG/ML
OPIATES UR QL: NEGATIVE NG/ML
OXIDANTS UR QL: NEGATIVE MCG/ML
OXYCODONE UR QL: NEGATIVE NG/ML
PCP UR QL: NEGATIVE NG/ML
PH UR: 7.4 [PH] (ref 4.5–9)
QUEST NOTES AND COMMENTS: ABNORMAL
THC UR QL: NEGATIVE NG/ML

## 2025-03-08 LAB
AMPHET UR-MCNC: 678 NG/ML
MDA UR-MCNC: NEGATIVE NG/ML
MDEA UR-MCNC: NEGATIVE NG/ML
MDMA UR-MCNC: NEGATIVE NG/ML
METHAMPHET UR-MCNC: NEGATIVE NG/ML
PHENTERMINE UR-MCNC: NEGATIVE NG/ML

## 2025-03-28 DIAGNOSIS — F90.0 ATTENTION DEFICIT HYPERACTIVITY DISORDER (ADHD), PREDOMINANTLY INATTENTIVE TYPE: ICD-10-CM

## 2025-03-28 RX ORDER — DEXTROAMPHETAMINE SACCHARATE, AMPHETAMINE ASPARTATE MONOHYDRATE, DEXTROAMPHETAMINE SULFATE AND AMPHETAMINE SULFATE 2.5; 2.5; 2.5; 2.5 MG/1; MG/1; MG/1; MG/1
10 CAPSULE, EXTENDED RELEASE ORAL EVERY MORNING
Qty: 30 CAPSULE | Refills: 0 | Status: SHIPPED | OUTPATIENT
Start: 2025-03-28

## 2025-04-28 DIAGNOSIS — F90.0 ATTENTION DEFICIT HYPERACTIVITY DISORDER (ADHD), PREDOMINANTLY INATTENTIVE TYPE: ICD-10-CM

## 2025-04-28 RX ORDER — DEXTROAMPHETAMINE SACCHARATE, AMPHETAMINE ASPARTATE MONOHYDRATE, DEXTROAMPHETAMINE SULFATE AND AMPHETAMINE SULFATE 2.5; 2.5; 2.5; 2.5 MG/1; MG/1; MG/1; MG/1
10 CAPSULE, EXTENDED RELEASE ORAL EVERY MORNING
Qty: 30 CAPSULE | Refills: 0 | Status: SHIPPED | OUTPATIENT
Start: 2025-04-28

## 2025-04-28 NOTE — TELEPHONE ENCOUNTER
Adderall (yearly stim)  Csa reviewed/uds 03/04/2025  Csv/oarrs 03/04/2025  Adderall rx on 09/27/2024 only #30 was dispensed ins did not cover #90

## 2025-05-30 ENCOUNTER — TELEPHONE (OUTPATIENT)
Dept: PRIMARY CARE | Facility: CLINIC | Age: 25
End: 2025-05-30
Payer: COMMERCIAL

## 2025-05-30 DIAGNOSIS — F90.0 ATTENTION DEFICIT HYPERACTIVITY DISORDER (ADHD), PREDOMINANTLY INATTENTIVE TYPE: ICD-10-CM

## 2025-05-30 RX ORDER — DEXTROAMPHETAMINE SACCHARATE, AMPHETAMINE ASPARTATE MONOHYDRATE, DEXTROAMPHETAMINE SULFATE AND AMPHETAMINE SULFATE 2.5; 2.5; 2.5; 2.5 MG/1; MG/1; MG/1; MG/1
10 CAPSULE, EXTENDED RELEASE ORAL EVERY MORNING
Qty: 30 CAPSULE | Refills: 0 | Status: SHIPPED | OUTPATIENT
Start: 2025-05-30

## 2025-05-30 NOTE — TELEPHONE ENCOUNTER
----- Message from Ashley De Dios sent at 5/30/2025  1:33 PM EDT -----  I do not see he picked up rx kaz sent  Can you ask what happened

## 2025-06-30 DIAGNOSIS — F90.0 ATTENTION DEFICIT HYPERACTIVITY DISORDER (ADHD), PREDOMINANTLY INATTENTIVE TYPE: ICD-10-CM

## 2025-06-30 RX ORDER — DEXTROAMPHETAMINE SACCHARATE, AMPHETAMINE ASPARTATE MONOHYDRATE, DEXTROAMPHETAMINE SULFATE AND AMPHETAMINE SULFATE 2.5; 2.5; 2.5; 2.5 MG/1; MG/1; MG/1; MG/1
10 CAPSULE, EXTENDED RELEASE ORAL EVERY MORNING
Qty: 30 CAPSULE | Refills: 0 | Status: SHIPPED | OUTPATIENT
Start: 2025-06-30

## 2025-07-30 DIAGNOSIS — F90.0 ATTENTION DEFICIT HYPERACTIVITY DISORDER (ADHD), PREDOMINANTLY INATTENTIVE TYPE: ICD-10-CM

## 2025-07-30 RX ORDER — DEXTROAMPHETAMINE SACCHARATE, AMPHETAMINE ASPARTATE MONOHYDRATE, DEXTROAMPHETAMINE SULFATE AND AMPHETAMINE SULFATE 2.5; 2.5; 2.5; 2.5 MG/1; MG/1; MG/1; MG/1
10 CAPSULE, EXTENDED RELEASE ORAL EVERY MORNING
Qty: 90 CAPSULE | Refills: 0 | Status: SHIPPED | OUTPATIENT
Start: 2025-07-30

## 2025-09-10 ENCOUNTER — APPOINTMENT (OUTPATIENT)
Dept: PRIMARY CARE | Facility: CLINIC | Age: 25
End: 2025-09-10
Payer: COMMERCIAL